# Patient Record
Sex: FEMALE | Race: WHITE | NOT HISPANIC OR LATINO | URBAN - METROPOLITAN AREA
[De-identification: names, ages, dates, MRNs, and addresses within clinical notes are randomized per-mention and may not be internally consistent; named-entity substitution may affect disease eponyms.]

---

## 2017-04-10 ENCOUNTER — APPOINTMENT (RX ONLY)
Dept: URBAN - METROPOLITAN AREA CLINIC 344 | Facility: CLINIC | Age: 67
Setting detail: DERMATOLOGY
End: 2017-04-10

## 2017-04-10 DIAGNOSIS — I87.2 VENOUS INSUFFICIENCY (CHRONIC) (PERIPHERAL): ICD-10-CM

## 2017-04-10 DIAGNOSIS — I83.9 ASYMPTOMATIC VARICOSE VEINS OF LOWER EXTREMITIES: ICD-10-CM

## 2017-04-10 PROBLEM — I83.92 ASYMPTOMATIC VARICOSE VEINS OF LEFT LOWER EXTREMITY: Status: ACTIVE | Noted: 2017-04-10

## 2017-04-10 PROBLEM — I83.11 VARICOSE VEINS OF RIGHT LOWER EXTREMITY WITH INFLAMMATION: Status: ACTIVE | Noted: 2017-04-10

## 2017-04-10 PROCEDURE — ? TREATMENT REGIMEN

## 2017-04-10 PROCEDURE — ? COUNSELING

## 2017-04-10 PROCEDURE — ? PRESCRIPTION

## 2017-04-10 PROCEDURE — ? CHRONOLOGY OF PRESENT ILLNESS

## 2017-04-10 PROCEDURE — 99213 OFFICE O/P EST LOW 20 MIN: CPT

## 2017-04-10 RX ORDER — TRIAMCINOLONE ACETONIDE 1 MG/G
CREAM TOPICAL QD
Qty: 1 | Refills: 3 | Status: ERX | COMMUNITY
Start: 2017-04-10

## 2017-04-10 RX ADMIN — TRIAMCINOLONE ACETONIDE: 1 CREAM TOPICAL at 00:00

## 2017-04-10 ASSESSMENT — LOCATION DETAILED DESCRIPTION DERM
LOCATION DETAILED: RIGHT PROXIMAL PRETIBIAL REGION
LOCATION DETAILED: LEFT KNEE

## 2017-04-10 ASSESSMENT — LOCATION ZONE DERM
LOCATION ZONE: LEG
LOCATION ZONE: LEG

## 2017-04-10 ASSESSMENT — LOCATION SIMPLE DESCRIPTION DERM
LOCATION SIMPLE: LEFT KNEE
LOCATION SIMPLE: RIGHT PRETIBIAL REGION

## 2017-04-10 NOTE — PROCEDURE: TREATMENT REGIMEN
Otc Regimen: Support socks are recommended and wear after rash clears, every other day
Detail Level: Simple
Initiate Treatment: TAC cream 1%: apply to affected area of legs twice a day for two weeks

## 2017-04-10 NOTE — PROCEDURE: CHRONOLOGY OF PRESENT ILLNESS
Chronology Of Present Illness: Patient will note areas of swelling in her legs from time to time dependent on multiple factors.  Support socks are recommended to reduce swelling.
Detail Level: Zone

## 2017-05-01 ENCOUNTER — APPOINTMENT (RX ONLY)
Dept: URBAN - METROPOLITAN AREA CLINIC 344 | Facility: CLINIC | Age: 67
Setting detail: DERMATOLOGY
End: 2017-05-01

## 2017-05-01 DIAGNOSIS — L81.4 OTHER MELANIN HYPERPIGMENTATION: ICD-10-CM

## 2017-05-01 DIAGNOSIS — I87.2 VENOUS INSUFFICIENCY (CHRONIC) (PERIPHERAL): ICD-10-CM

## 2017-05-01 PROBLEM — I83.12 VARICOSE VEINS OF LEFT LOWER EXTREMITY WITH INFLAMMATION: Status: ACTIVE | Noted: 2017-05-01

## 2017-05-01 PROBLEM — I83.11 VARICOSE VEINS OF RIGHT LOWER EXTREMITY WITH INFLAMMATION: Status: ACTIVE | Noted: 2017-05-01

## 2017-05-01 PROCEDURE — 99213 OFFICE O/P EST LOW 20 MIN: CPT

## 2017-05-01 PROCEDURE — ? COUNSELING

## 2017-05-01 PROCEDURE — ? TREATMENT REGIMEN

## 2017-05-01 ASSESSMENT — LOCATION DETAILED DESCRIPTION DERM
LOCATION DETAILED: RIGHT DISTAL PRETIBIAL REGION
LOCATION DETAILED: RIGHT PROXIMAL PRETIBIAL REGION
LOCATION DETAILED: LEFT INFERIOR CENTRAL MALAR CHEEK
LOCATION DETAILED: LEFT DISTAL PRETIBIAL REGION

## 2017-05-01 ASSESSMENT — LOCATION ZONE DERM
LOCATION ZONE: LEG
LOCATION ZONE: FACE

## 2017-05-01 ASSESSMENT — LOCATION SIMPLE DESCRIPTION DERM
LOCATION SIMPLE: LEFT PRETIBIAL REGION
LOCATION SIMPLE: LEFT CHEEK
LOCATION SIMPLE: RIGHT PRETIBIAL REGION

## 2017-05-01 NOTE — PROCEDURE: TREATMENT REGIMEN
Detail Level: Simple
Discontinue Regimen: Triamcinolone
Plan: Consider biopsy at next visit if no improvement
Samples Given: Ultravate lotion - apply to affected areas once daily \\nPramosone - apply to affected areas, after applying the ultravate lotion, two to three times daily\\nCerave itch relief lotion and wash

## 2017-05-16 ENCOUNTER — APPOINTMENT (RX ONLY)
Dept: URBAN - METROPOLITAN AREA CLINIC 344 | Facility: CLINIC | Age: 67
Setting detail: DERMATOLOGY
End: 2017-05-16

## 2017-05-16 DIAGNOSIS — M79.89 OTHER SPECIFIED SOFT TISSUE DISORDERS: ICD-10-CM

## 2017-05-16 DIAGNOSIS — I87.2 VENOUS INSUFFICIENCY (CHRONIC) (PERIPHERAL): ICD-10-CM | Status: IMPROVED

## 2017-05-16 PROBLEM — I83.11 VARICOSE VEINS OF RIGHT LOWER EXTREMITY WITH INFLAMMATION: Status: ACTIVE | Noted: 2017-05-16

## 2017-05-16 PROBLEM — I83.12 VARICOSE VEINS OF LEFT LOWER EXTREMITY WITH INFLAMMATION: Status: ACTIVE | Noted: 2017-05-16

## 2017-05-16 PROCEDURE — ? COUNSELING

## 2017-05-16 PROCEDURE — ? TREATMENT REGIMEN

## 2017-05-16 PROCEDURE — 99213 OFFICE O/P EST LOW 20 MIN: CPT

## 2017-05-16 ASSESSMENT — LOCATION DETAILED DESCRIPTION DERM
LOCATION DETAILED: RIGHT DISTAL PRETIBIAL REGION
LOCATION DETAILED: LEFT PROXIMAL PRETIBIAL REGION
LOCATION DETAILED: LEFT DISTAL PRETIBIAL REGION
LOCATION DETAILED: RIGHT PROXIMAL PRETIBIAL REGION

## 2017-05-16 ASSESSMENT — LOCATION SIMPLE DESCRIPTION DERM
LOCATION SIMPLE: RIGHT PRETIBIAL REGION
LOCATION SIMPLE: LEFT PRETIBIAL REGION

## 2017-05-16 ASSESSMENT — LOCATION ZONE DERM: LOCATION ZONE: LEG

## 2017-05-16 NOTE — PROCEDURE: TREATMENT REGIMEN
Detail Level: Simple
Plan: Send for Ultravate lotion rx if patient experiences a bad flares. Use Edd cash pay option
Otc Regimen: Encouraged wearing compression stockings and keeping legs elevated when possible
Detail Level: Zone
Initiate Treatment: Cerave SA cream to affected areas daily after showering
Modify Regimen: Triamcinolone cream prn for flares

## 2017-05-22 ENCOUNTER — IMPORTED ENCOUNTER (OUTPATIENT)
Dept: URBAN - METROPOLITAN AREA CLINIC 59 | Facility: CLINIC | Age: 67
End: 2017-05-22

## 2017-05-22 PROBLEM — H53.031 STRABISMIC AMBLYOPIA, RIGHT EYE: Noted: 2017-05-22

## 2017-05-22 PROBLEM — H18.51 FUCHS' DYSTROPHY: Noted: 2017-05-22

## 2017-05-22 PROBLEM — H26.493 OTHER SECONDARY CATARACT, BILATERAL: Noted: 2017-05-22

## 2017-05-22 PROBLEM — H04.122 TEAR FILM INSUFFICIENCY OF LT LACRIMAL GLAND: Noted: 2017-05-22

## 2017-05-22 PROBLEM — H04.121 TEAR FILM INSUFFICIENCY OF RT LACRIMAL GLAND: Noted: 2017-05-22

## 2017-05-22 PROCEDURE — 83861 MICROFLUID ANALY TEARS: CPT

## 2017-05-22 PROCEDURE — 92014 COMPRE OPH EXAM EST PT 1/>: CPT

## 2017-10-11 ENCOUNTER — APPOINTMENT (RX ONLY)
Dept: URBAN - METROPOLITAN AREA CLINIC 348 | Facility: CLINIC | Age: 67
Setting detail: DERMATOLOGY
End: 2017-10-11

## 2017-10-11 DIAGNOSIS — L82.1 OTHER SEBORRHEIC KERATOSIS: ICD-10-CM

## 2017-10-11 DIAGNOSIS — D18.0 HEMANGIOMA: ICD-10-CM

## 2017-10-11 DIAGNOSIS — D22 MELANOCYTIC NEVI: ICD-10-CM

## 2017-10-11 DIAGNOSIS — L81.4 OTHER MELANIN HYPERPIGMENTATION: ICD-10-CM

## 2017-10-11 PROBLEM — D22.5 MELANOCYTIC NEVI OF TRUNK: Status: ACTIVE | Noted: 2017-10-11

## 2017-10-11 PROBLEM — D18.01 HEMANGIOMA OF SKIN AND SUBCUTANEOUS TISSUE: Status: ACTIVE | Noted: 2017-10-11

## 2017-10-11 PROCEDURE — ? COUNSELING

## 2017-10-11 PROCEDURE — 99214 OFFICE O/P EST MOD 30 MIN: CPT

## 2017-10-11 ASSESSMENT — LOCATION DETAILED DESCRIPTION DERM
LOCATION DETAILED: LEFT MEDIAL UPPER BACK
LOCATION DETAILED: SUPERIOR THORACIC SPINE
LOCATION DETAILED: INFERIOR THORACIC SPINE
LOCATION DETAILED: LEFT SUPERIOR MEDIAL UPPER BACK

## 2017-10-11 ASSESSMENT — LOCATION SIMPLE DESCRIPTION DERM
LOCATION SIMPLE: UPPER BACK
LOCATION SIMPLE: LEFT UPPER BACK

## 2017-10-11 ASSESSMENT — LOCATION ZONE DERM: LOCATION ZONE: TRUNK

## 2017-11-20 ENCOUNTER — IMPORTED ENCOUNTER (OUTPATIENT)
Dept: URBAN - METROPOLITAN AREA CLINIC 59 | Facility: CLINIC | Age: 67
End: 2017-11-20

## 2017-11-20 PROBLEM — H26.493 OTHER SECONDARY CATARACT, BILATERAL: Noted: 2017-11-20

## 2017-11-20 PROBLEM — H18.51 FUCHS' DYSTROPHY: Noted: 2017-11-20

## 2017-11-20 PROBLEM — H04.121 TEAR FILM INSUFFICIENCY OF RT LACRIMAL GLAND: Noted: 2017-11-20

## 2017-11-20 PROBLEM — H53.031 STRABISMIC AMBLYOPIA, RIGHT EYE: Noted: 2017-11-20

## 2017-11-20 PROBLEM — H04.122 TEAR FILM INSUFFICIENCY OF LT LACRIMAL GLAND: Noted: 2017-11-20

## 2017-11-20 PROCEDURE — 92012 INTRM OPH EXAM EST PATIENT: CPT

## 2018-05-24 ENCOUNTER — IMPORTED ENCOUNTER (OUTPATIENT)
Dept: URBAN - METROPOLITAN AREA CLINIC 59 | Facility: CLINIC | Age: 68
End: 2018-05-24

## 2018-05-24 PROBLEM — H04.121 TEAR FILM INSUFFICIENCY OF RT LACRIMAL GLAND: Noted: 2018-05-24

## 2018-05-24 PROBLEM — H18.51 FUCHS' DYSTROPHY: Noted: 2018-05-24

## 2018-05-24 PROBLEM — H04.122 TEAR FILM INSUFFICIENCY OF LT LACRIMAL GLAND: Noted: 2018-05-24

## 2018-05-24 PROBLEM — H26.492 OTHER SECONDARY CATARACT, LEFT EYE: Noted: 2018-05-24

## 2018-05-24 PROCEDURE — 92014 COMPRE OPH EXAM EST PT 1/>: CPT

## 2019-03-08 ENCOUNTER — APPOINTMENT (RX ONLY)
Dept: URBAN - METROPOLITAN AREA CLINIC 348 | Facility: CLINIC | Age: 69
Setting detail: DERMATOLOGY
End: 2019-03-08

## 2019-03-08 DIAGNOSIS — D18.0 HEMANGIOMA: ICD-10-CM

## 2019-03-08 DIAGNOSIS — D22 MELANOCYTIC NEVI: ICD-10-CM

## 2019-03-08 DIAGNOSIS — L81.4 OTHER MELANIN HYPERPIGMENTATION: ICD-10-CM

## 2019-03-08 DIAGNOSIS — L82.1 OTHER SEBORRHEIC KERATOSIS: ICD-10-CM

## 2019-03-08 PROBLEM — D22.5 MELANOCYTIC NEVI OF TRUNK: Status: ACTIVE | Noted: 2019-03-08

## 2019-03-08 PROBLEM — D18.01 HEMANGIOMA OF SKIN AND SUBCUTANEOUS TISSUE: Status: ACTIVE | Noted: 2019-03-08

## 2019-03-08 PROCEDURE — 99213 OFFICE O/P EST LOW 20 MIN: CPT

## 2019-03-08 PROCEDURE — ? COUNSELING

## 2019-03-08 ASSESSMENT — LOCATION DETAILED DESCRIPTION DERM
LOCATION DETAILED: LEFT SUPERIOR MEDIAL UPPER BACK
LOCATION DETAILED: INFERIOR THORACIC SPINE
LOCATION DETAILED: LEFT MEDIAL UPPER BACK
LOCATION DETAILED: SUPERIOR THORACIC SPINE

## 2019-03-08 ASSESSMENT — LOCATION SIMPLE DESCRIPTION DERM
LOCATION SIMPLE: UPPER BACK
LOCATION SIMPLE: LEFT UPPER BACK

## 2019-03-08 ASSESSMENT — LOCATION ZONE DERM: LOCATION ZONE: TRUNK

## 2019-03-08 NOTE — HPI: EVALUATION OF SKIN LESION(S)
What Type Of Note Output Would You Prefer (Optional)?: Standard Output
Hpi Title: Evaluation of Skin Lesions
Family Member: Grandfather

## 2019-05-30 ENCOUNTER — IMPORTED ENCOUNTER (OUTPATIENT)
Dept: URBAN - METROPOLITAN AREA CLINIC 59 | Facility: CLINIC | Age: 69
End: 2019-05-30

## 2019-05-30 PROBLEM — H04.122 TEAR FILM INSUFFICIENCY OF LT LACRIMAL GLAND: Noted: 2019-05-30

## 2019-05-30 PROBLEM — H18.51 FUCHS' DYSTROPHY: Noted: 2019-05-30

## 2019-05-30 PROBLEM — H53.031 STRABISMIC AMBLYOPIA, RIGHT EYE: Noted: 2019-05-30

## 2019-05-30 PROBLEM — H04.121 TEAR FILM INSUFFICIENCY OF RT LACRIMAL GLAND: Noted: 2019-05-30

## 2019-05-30 PROBLEM — H26.493 OTHER SECONDARY CATARACT, BILATERAL: Noted: 2019-05-30

## 2019-05-30 PROBLEM — H43.813 VITREOUS DEGENERATION, BILATERAL: Noted: 2019-05-30

## 2019-05-30 PROCEDURE — 92014 COMPRE OPH EXAM EST PT 1/>: CPT

## 2020-06-01 ENCOUNTER — IMPORTED ENCOUNTER (OUTPATIENT)
Dept: URBAN - METROPOLITAN AREA CLINIC 59 | Facility: CLINIC | Age: 70
End: 2020-06-01

## 2020-06-01 PROBLEM — H04.121 TEAR FILM INSUFFICIENCY OF RT LACRIMAL GLAND: Noted: 2020-06-01

## 2020-06-01 PROBLEM — H18.51 FUCHS' DYSTROPHY: Noted: 2020-06-01

## 2020-06-01 PROBLEM — H26.493 OTHER SECONDARY CATARACT, BILATERAL: Noted: 2020-06-01

## 2020-06-01 PROBLEM — H04.122 TEAR FILM INSUFFICIENCY OF LT LACRIMAL GLAND: Noted: 2020-06-01

## 2020-06-01 PROBLEM — H53.031 STRABISMIC AMBLYOPIA, RIGHT EYE: Noted: 2020-06-01

## 2020-06-01 PROCEDURE — 92014 COMPRE OPH EXAM EST PT 1/>: CPT

## 2020-06-01 PROCEDURE — 76514 ECHO EXAM OF EYE THICKNESS: CPT

## 2020-07-09 ENCOUNTER — APPOINTMENT (RX ONLY)
Dept: URBAN - METROPOLITAN AREA CLINIC 348 | Facility: CLINIC | Age: 70
Setting detail: DERMATOLOGY
End: 2020-07-09

## 2020-07-09 DIAGNOSIS — L30.9 DERMATITIS, UNSPECIFIED: ICD-10-CM

## 2020-07-09 PROCEDURE — ? PRESCRIPTION

## 2020-07-09 PROCEDURE — ? COUNSELING

## 2020-07-09 PROCEDURE — 99213 OFFICE O/P EST LOW 20 MIN: CPT

## 2020-07-09 PROCEDURE — ? ORDER TESTS

## 2020-07-09 PROCEDURE — ? TREATMENT REGIMEN

## 2020-07-09 RX ORDER — DESONIDE 0.5 MG/G
CREAM TOPICAL BID
Qty: 1 | Refills: 3 | Status: ERX | COMMUNITY
Start: 2020-07-09

## 2020-07-09 RX ADMIN — DESONIDE: 0.5 CREAM TOPICAL at 00:00

## 2020-07-09 ASSESSMENT — LOCATION SIMPLE DESCRIPTION DERM
LOCATION SIMPLE: RIGHT PRETIBIAL REGION
LOCATION SIMPLE: LEFT PRETIBIAL REGION

## 2020-07-09 ASSESSMENT — LOCATION DETAILED DESCRIPTION DERM
LOCATION DETAILED: LEFT PROXIMAL PRETIBIAL REGION
LOCATION DETAILED: RIGHT PROXIMAL PRETIBIAL REGION

## 2020-07-09 ASSESSMENT — LOCATION ZONE DERM: LOCATION ZONE: LEG

## 2020-07-21 ENCOUNTER — RX ONLY (OUTPATIENT)
Age: 70
Setting detail: RX ONLY
End: 2020-07-21

## 2020-07-21 RX ORDER — TRIAMCINOLONE ACETONIDE 1 MG/G
CREAM TOPICAL QD
Qty: 1 | Refills: 3 | Status: ERX

## 2020-07-30 ENCOUNTER — APPOINTMENT (RX ONLY)
Dept: URBAN - METROPOLITAN AREA CLINIC 348 | Facility: CLINIC | Age: 70
Setting detail: DERMATOLOGY
End: 2020-07-30

## 2020-07-30 DIAGNOSIS — L56.8 OTHER SPECIFIED ACUTE SKIN CHANGES DUE TO ULTRAVIOLET RADIATION: ICD-10-CM

## 2020-07-30 DIAGNOSIS — I87.2 VENOUS INSUFFICIENCY (CHRONIC) (PERIPHERAL): ICD-10-CM | Status: IMPROVED

## 2020-07-30 PROBLEM — I10 ESSENTIAL (PRIMARY) HYPERTENSION: Status: ACTIVE | Noted: 2020-07-30

## 2020-07-30 PROCEDURE — ? TREATMENT REGIMEN

## 2020-07-30 PROCEDURE — 99213 OFFICE O/P EST LOW 20 MIN: CPT

## 2020-07-30 PROCEDURE — ? COUNSELING

## 2020-07-30 PROCEDURE — ? OTHER

## 2020-07-30 ASSESSMENT — LOCATION DETAILED DESCRIPTION DERM
LOCATION DETAILED: LEFT PROXIMAL DORSAL FOREARM
LOCATION DETAILED: RIGHT DISTAL DORSAL FOREARM
LOCATION DETAILED: LEFT PROXIMAL PRETIBIAL REGION
LOCATION DETAILED: RIGHT DISTAL PRETIBIAL REGION

## 2020-07-30 ASSESSMENT — LOCATION ZONE DERM
LOCATION ZONE: LEG
LOCATION ZONE: ARM

## 2020-07-30 ASSESSMENT — LOCATION SIMPLE DESCRIPTION DERM
LOCATION SIMPLE: LEFT PRETIBIAL REGION
LOCATION SIMPLE: LEFT FOREARM
LOCATION SIMPLE: RIGHT PRETIBIAL REGION
LOCATION SIMPLE: RIGHT FOREARM

## 2020-07-30 NOTE — PROCEDURE: TREATMENT REGIMEN
Continue Regimen: Triamcinolone once daily x 1 week and then every other day for maintenance
Plan: Advised to see PCP for symptoms as may need medical management of edema
Detail Level: Zone
Continue Regimen: Triamcinolone as needed for itch/redness

## 2020-07-30 NOTE — PROCEDURE: OTHER
Note Text (......Xxx Chief Complaint.): This diagnosis correlates with the
Other (Free Text): Today we discussed that the rash is in areas where the sun will react due to her cholesterol medicine. We recommended today to use sunscreen daily of a spf 30 or higher q 90 minutes.
Detail Level: Detailed

## 2021-05-10 ENCOUNTER — IMPORTED ENCOUNTER (OUTPATIENT)
Dept: URBAN - METROPOLITAN AREA CLINIC 59 | Facility: CLINIC | Age: 71
End: 2021-05-10

## 2021-05-10 PROBLEM — H18.513 ENDOTHELIAL CORNEAL DYSTROPHY, BILATERAL: Noted: 2021-05-10

## 2021-05-10 PROBLEM — H04.122 TEAR FILM INSUFFICIENCY OF LT LACRIMAL GLAND: Noted: 2021-05-10

## 2021-05-10 PROBLEM — H04.121 TEAR FILM INSUFFICIENCY OF RT LACRIMAL GLAND: Noted: 2021-05-10

## 2021-05-10 PROBLEM — H53.031 STRABISMIC AMBLYOPIA, RIGHT EYE: Noted: 2021-05-10

## 2021-05-10 PROBLEM — H26.493 OTHER SECONDARY CATARACT, BILATERAL: Noted: 2021-05-10

## 2021-05-10 PROCEDURE — 92015 DETERMINE REFRACTIVE STATE: CPT

## 2021-05-10 PROCEDURE — 92014 COMPRE OPH EXAM EST PT 1/>: CPT

## 2021-11-18 ENCOUNTER — IMPORTED ENCOUNTER (OUTPATIENT)
Dept: URBAN - METROPOLITAN AREA CLINIC 59 | Facility: CLINIC | Age: 71
End: 2021-11-18

## 2021-11-18 PROBLEM — H53.031 STRABISMIC AMBLYOPIA, RIGHT EYE: Noted: 2021-11-18

## 2021-11-18 PROBLEM — H18.513 ENDOTHELIAL CORNEAL DYSTROPHY, BILATERAL: Noted: 2021-11-18

## 2021-11-18 PROBLEM — H04.122 TEAR FILM INSUFFICIENCY OF LT LACRIMAL GLAND: Noted: 2021-11-18

## 2021-11-18 PROBLEM — H04.121 TEAR FILM INSUFFICIENCY OF RT LACRIMAL GLAND: Noted: 2021-11-18

## 2021-11-18 PROBLEM — H26.493 OTHER SECONDARY CATARACT, BILATERAL: Noted: 2021-11-18

## 2021-11-18 PROCEDURE — 92012 INTRM OPH EXAM EST PATIENT: CPT

## 2022-05-23 ENCOUNTER — IMPORTED ENCOUNTER (OUTPATIENT)
Dept: URBAN - METROPOLITAN AREA CLINIC 59 | Facility: CLINIC | Age: 72
End: 2022-05-23

## 2022-05-23 PROBLEM — H04.121 TEAR FILM INSUFFICIENCY OF RT LACRIMAL GLAND: Noted: 2022-05-23

## 2022-05-23 PROBLEM — H26.493 OTHER SECONDARY CATARACT, BILATERAL: Noted: 2022-05-23

## 2022-05-23 PROBLEM — H04.122 TEAR FILM INSUFFICIENCY OF LT LACRIMAL GLAND: Noted: 2022-05-23

## 2022-05-23 PROBLEM — H18.513 ENDOTHELIAL CORNEAL DYSTROPHY, BILATERAL: Noted: 2022-05-23

## 2022-05-23 PROCEDURE — 92014 COMPRE OPH EXAM EST PT 1/>: CPT

## 2023-04-21 ENCOUNTER — APPOINTMENT (RX ONLY)
Dept: URBAN - METROPOLITAN AREA CLINIC 341 | Facility: CLINIC | Age: 73
Setting detail: DERMATOLOGY
End: 2023-04-21

## 2023-04-21 DIAGNOSIS — L08.9 LOCAL INFECTION OF THE SKIN AND SUBCUTANEOUS TISSUE, UNSPECIFIED: ICD-10-CM | Status: INADEQUATELY CONTROLLED

## 2023-04-21 PROCEDURE — 99214 OFFICE O/P EST MOD 30 MIN: CPT

## 2023-04-21 PROCEDURE — ? PRESCRIPTION

## 2023-04-21 PROCEDURE — ? PRESCRIPTION MEDICATION MANAGEMENT

## 2023-04-21 PROCEDURE — ? ORDER TESTS

## 2023-04-21 PROCEDURE — ? COUNSELING

## 2023-04-21 RX ORDER — NYSTATIN CREAM 100000 [USP'U]/G
CREAM TOPICAL
Qty: 30 | Refills: 1 | Status: ERX | COMMUNITY
Start: 2023-04-21

## 2023-04-21 RX ORDER — CLINDAMYCIN PHOSPHATE 10 MG/ML
SOLUTION TOPICAL
Qty: 60 | Refills: 3 | Status: ERX | COMMUNITY
Start: 2023-04-21

## 2023-04-21 RX ADMIN — NYSTATIN CREAM: 100000 CREAM TOPICAL at 00:00

## 2023-04-21 RX ADMIN — CLINDAMYCIN PHOSPHATE: 10 SOLUTION TOPICAL at 00:00

## 2023-04-21 ASSESSMENT — LOCATION DETAILED DESCRIPTION DERM
LOCATION DETAILED: RIGHT RIB CAGE
LOCATION DETAILED: LEFT RIB CAGE
LOCATION DETAILED: LEFT SUPRAPUBIC SKIN

## 2023-04-21 ASSESSMENT — LOCATION SIMPLE DESCRIPTION DERM
LOCATION SIMPLE: ABDOMEN
LOCATION SIMPLE: GROIN

## 2023-04-21 ASSESSMENT — LOCATION ZONE DERM: LOCATION ZONE: TRUNK

## 2023-04-21 NOTE — PROCEDURE: PRESCRIPTION MEDICATION MANAGEMENT
Detail Level: Zone
Initiate Treatment: clindamycin phosphate 1 % topical swab - Use to aa beneath abdomen and breast once daily\\n\\nnystatin 100,000 unit/gram topical cream - Apply to the affected areas on the abdomen and under breast daily
Render In Strict Bullet Format?: No

## 2023-04-21 NOTE — PROCEDURE: ORDER TESTS
Bill For Surgical Tray: no
Billing Type: Third-Party Bill
Performing Laboratory: [id number]
Expected Date Of Service: 04/21/2023

## 2023-05-05 ENCOUNTER — APPOINTMENT (RX ONLY)
Dept: URBAN - METROPOLITAN AREA CLINIC 341 | Facility: CLINIC | Age: 73
Setting detail: DERMATOLOGY
End: 2023-05-05

## 2023-05-05 DIAGNOSIS — L08.9 LOCAL INFECTION OF THE SKIN AND SUBCUTANEOUS TISSUE, UNSPECIFIED: ICD-10-CM | Status: RESOLVING

## 2023-05-05 PROCEDURE — ? PRESCRIPTION MEDICATION MANAGEMENT

## 2023-05-05 PROCEDURE — 99213 OFFICE O/P EST LOW 20 MIN: CPT

## 2023-05-05 PROCEDURE — ? COUNSELING

## 2023-05-05 ASSESSMENT — LOCATION DETAILED DESCRIPTION DERM
LOCATION DETAILED: LEFT RIB CAGE
LOCATION DETAILED: LEFT SUPRAPUBIC SKIN
LOCATION DETAILED: RIGHT RIB CAGE

## 2023-05-05 ASSESSMENT — LOCATION SIMPLE DESCRIPTION DERM
LOCATION SIMPLE: GROIN
LOCATION SIMPLE: ABDOMEN

## 2023-05-05 ASSESSMENT — LOCATION ZONE DERM: LOCATION ZONE: TRUNK

## 2023-05-05 NOTE — PROCEDURE: PRESCRIPTION MEDICATION MANAGEMENT
Plan: Patient may continue Bactrim from oncologist\\nReviewed culture results MRSA with sensitivity to clindamycin
Detail Level: Zone
Continue Regimen: nystatin 100,000 unit/gram topical cream - Apply to the affected areas on the abdomen and under breast daily
Modify Regimen: clindamycin phosphate 1 % topical swab - Use to aa beneath abdomen and breast once daily as needed. Given clear today advised taking a break and restarting only is rash flares again
Render In Strict Bullet Format?: No

## 2023-10-15 ASSESSMENT — VISUAL ACUITY
OD_CC: 20/80-2
OS_CC: 20/20
OD_CC: 20/80
OS_CC: 20/25-2
OD_CC: 20/40-3
OS_CC: 20/25
OS_CC: 20/40+1
OS_CC: 20/25
OD_CC: 20/80
OS_CC: J1
OD_CC: J5
OD_CC: 20/60+1
OD_CC: 20/100+1
OS_CC: 20/30+2
OD_CC: 20/80+1
OS_CC: 20/30
OD_CC: 20/40
OS_CC: 20/25+2

## 2023-10-15 ASSESSMENT — TONOMETRY
OS_IOP_MMHG: 12
OS_IOP_MMHG: 13
OS_IOP_MMHG: 13
OD_IOP_MMHG: 12
OS_IOP_MMHG: 13
OS_IOP_MMHG: 15
OD_IOP_MMHG: 13
OD_IOP_MMHG: 15
OD_IOP_MMHG: 13
OD_IOP_MMHG: 12
OD_IOP_MMHG: 14
OS_IOP_MMHG: 12

## 2023-10-15 ASSESSMENT — PACHYMETRY
OD_CT_UM: C:  FINAL: 529.000; P:
OS_CT_UM: C:  FINAL: 527.000; P: